# Patient Record
Sex: MALE | Race: WHITE | NOT HISPANIC OR LATINO | URBAN - METROPOLITAN AREA
[De-identification: names, ages, dates, MRNs, and addresses within clinical notes are randomized per-mention and may not be internally consistent; named-entity substitution may affect disease eponyms.]

---

## 2024-04-10 ENCOUNTER — APPOINTMENT (OUTPATIENT)
Dept: OCCUPATIONAL THERAPY | Facility: CLINIC | Age: 69
End: 2024-04-10
Payer: MEDICARE

## 2024-04-12 ENCOUNTER — EVALUATION (OUTPATIENT)
Dept: OCCUPATIONAL THERAPY | Facility: CLINIC | Age: 69
End: 2024-04-12
Payer: MEDICARE

## 2024-04-12 DIAGNOSIS — M72.0 PALMAR FASCIAL FIBROMATOSIS (DUPUYTREN): ICD-10-CM

## 2024-04-12 DIAGNOSIS — M72.0 DUPUYTREN'S DISEASE: Primary | ICD-10-CM

## 2024-04-12 PROCEDURE — 97760 ORTHOTIC MGMT&TRAING 1ST ENC: CPT | Mod: GO | Performed by: OCCUPATIONAL THERAPIST

## 2024-04-12 PROCEDURE — 97165 OT EVAL LOW COMPLEX 30 MIN: CPT | Mod: GO | Performed by: OCCUPATIONAL THERAPIST

## 2024-04-12 ASSESSMENT — ENCOUNTER SYMPTOMS
OCCASIONAL FEELINGS OF UNSTEADINESS: 0
LOSS OF SENSATION IN FEET: 0
DEPRESSION: 0

## 2024-04-12 ASSESSMENT — PATIENT HEALTH QUESTIONNAIRE - PHQ9
SUM OF ALL RESPONSES TO PHQ9 QUESTIONS 1 AND 2: 0
1. LITTLE INTEREST OR PLEASURE IN DOING THINGS: NOT AT ALL
2. FEELING DOWN, DEPRESSED OR HOPELESS: NOT AT ALL

## 2024-04-12 ASSESSMENT — PAIN SCALES - GENERAL: PAINLEVEL_OUTOF10: 0 - NO PAIN

## 2024-04-12 ASSESSMENT — PAIN - FUNCTIONAL ASSESSMENT: PAIN_FUNCTIONAL_ASSESSMENT: 0-10

## 2024-04-12 NOTE — PROGRESS NOTES
Occupational Therapy Evaluation    Patient Name:  Tristian Longo   Patient MRN: [id number]  Date: 04/12/24  Time Calculation  Start Time: 0950  Stop Time: 1030  Time Calculation (min): 40 min    OT Evaluation Time Entry  OT Evaluation (Low) Time Entry: 10  OT Therapeutic Procedures Time Entry  Orthotic Management Training Time Entry: 30                   ASSESSMENT:  Patient was referred to occupational therapy for an evaluation and treatment s/p left IV and V digit dupuytren's release. OT evaluation completed this date.  Patient's main functional deficits include decreased use of left hand for lifting, carrying, ADLs .  Patient would benefit from skilled OT in order to increase range of motion and education on splint instructions.    OT fabricated thermoplastic night extension splint for his left hand to be worn at night for next 4 months.  Patient was instructed in rationale behind splint purpose, wearing schedule, precautions and care of splint.  Patient verbalized good understanding and demonstrated good ability to don/doff splint correctly.    Patient was instructed to call OT with fit issues and/or questions.  Patient to follow up with physician in 10 days for suture removal.    PLAN:   OT intervention plan includes: education/instruction, home program, orthotic fitting and training.    Plan of care was developed with input and agreement by the patient.       Patient to be discharged from skilled OT at this time with wear and care instructions.     Insurance:  Visit number: 1 Insurance Type: Payor: MEDICARE / Plan: MEDICARE PART A AND B / Product Type: *No Product type* /   Authorization or Plan of Care date Range: 4/12/24-5/12/24  Copay: NA  Referred by: Tristian Fox MD     SUBJECTIVE:  Patient is a 68 year old who attends OT evaluation s/p dupuytren's release to IV and V digits of left hand on 4/10/24..  he  reports no complaints of pain at this time.  He is sent to OT for night splint fabrication  "per physician script.    he is RHD   his chief complaint is inability ot use left hand.  his goal for Occupational Therapy is to return to full hand function      he lives with his spouse in a single family home. he is retired.    General:  Reason for visit: Dupuytren's contracture/splint  Referred by: Dr. Tristian Fox    [facility] includes: unremarkable    Medical Screening:   Reviewed medical history form with patient and medical screening assessed.     Precautions: sutures intact   Fall Risk: None          Pain Assessment:      Pain Assessment: 0-10      Pain (0-10): 0       Pain Location left hand    OBJECTIVE:  AROM:  Full AROM I-III digits  75% AROM IV and V digits    Strength:  NT as sutures intact    Outcome Measures:  OT Adult Other Outcome Measures  Other Outcome Measures: Quick Dash 28  (38.64%)      Goals:  1.  Patient to don and doff splint without difficulty and verbalize understanding of wearing schedule and precautions.    Treatment Performed: (\"NP\" = Not Performed)     Treatment:  Low Complex OT Eval 10 min    Therapeutic Exercise: NP  -   Therapeutic Activities: NP  -   Manual Therapy: NP  -   Neuromuscular Re-Education: NP  -   Modalities: NP  -   Orthotic Fit and Train: 30 min  - Fabricated night extension splint for IV and V digit    Education: Educated regarding proper donning/doffing and wearing schedule of orthotic. And precautions associated.  "

## 2024-05-03 ENCOUNTER — AMBULATORY SURGICAL CENTER (OUTPATIENT)
Dept: URBAN - METROPOLITAN AREA SURGERY 12 | Facility: SURGERY | Age: 69
End: 2024-05-03
Payer: MEDICARE

## 2024-05-03 VITALS
TEMPERATURE: 97.5 F | DIASTOLIC BLOOD PRESSURE: 73 MMHG | DIASTOLIC BLOOD PRESSURE: 80 MMHG | RESPIRATION RATE: 18 BRPM | DIASTOLIC BLOOD PRESSURE: 84 MMHG | SYSTOLIC BLOOD PRESSURE: 140 MMHG | OXYGEN SATURATION: 95 % | OXYGEN SATURATION: 100 % | HEART RATE: 49 BPM | HEART RATE: 62 BPM | RESPIRATION RATE: 12 BRPM | RESPIRATION RATE: 17 BRPM | OXYGEN SATURATION: 100 % | RESPIRATION RATE: 16 BRPM | OXYGEN SATURATION: 94 % | DIASTOLIC BLOOD PRESSURE: 80 MMHG | OXYGEN SATURATION: 96 % | DIASTOLIC BLOOD PRESSURE: 71 MMHG | OXYGEN SATURATION: 98 % | RESPIRATION RATE: 15 BRPM | HEART RATE: 64 BPM | SYSTOLIC BLOOD PRESSURE: 123 MMHG | SYSTOLIC BLOOD PRESSURE: 132 MMHG | DIASTOLIC BLOOD PRESSURE: 69 MMHG | RESPIRATION RATE: 12 BRPM | DIASTOLIC BLOOD PRESSURE: 79 MMHG | SYSTOLIC BLOOD PRESSURE: 133 MMHG | SYSTOLIC BLOOD PRESSURE: 115 MMHG | OXYGEN SATURATION: 99 % | SYSTOLIC BLOOD PRESSURE: 119 MMHG | HEART RATE: 52 BPM | RESPIRATION RATE: 20 BRPM | RESPIRATION RATE: 16 BRPM | HEART RATE: 47 BPM | RESPIRATION RATE: 13 BRPM | RESPIRATION RATE: 18 BRPM | DIASTOLIC BLOOD PRESSURE: 73 MMHG | HEART RATE: 51 BPM | DIASTOLIC BLOOD PRESSURE: 79 MMHG | HEART RATE: 61 BPM | HEART RATE: 47 BPM | RESPIRATION RATE: 14 BRPM | DIASTOLIC BLOOD PRESSURE: 74 MMHG | SYSTOLIC BLOOD PRESSURE: 154 MMHG | RESPIRATION RATE: 18 BRPM | DIASTOLIC BLOOD PRESSURE: 73 MMHG | RESPIRATION RATE: 19 BRPM | OXYGEN SATURATION: 95 % | SYSTOLIC BLOOD PRESSURE: 115 MMHG | OXYGEN SATURATION: 99 % | DIASTOLIC BLOOD PRESSURE: 74 MMHG | WEIGHT: 190 LBS | HEART RATE: 72 BPM | OXYGEN SATURATION: 97 % | HEART RATE: 72 BPM | DIASTOLIC BLOOD PRESSURE: 74 MMHG | OXYGEN SATURATION: 96 % | RESPIRATION RATE: 11 BRPM | OXYGEN SATURATION: 97 % | TEMPERATURE: 97.5 F | HEIGHT: 69 IN | HEART RATE: 64 BPM | DIASTOLIC BLOOD PRESSURE: 77 MMHG | OXYGEN SATURATION: 95 % | TEMPERATURE: 97.5 F | HEART RATE: 51 BPM | SYSTOLIC BLOOD PRESSURE: 115 MMHG | SYSTOLIC BLOOD PRESSURE: 137 MMHG | RESPIRATION RATE: 13 BRPM | HEART RATE: 62 BPM | RESPIRATION RATE: 11 BRPM | DIASTOLIC BLOOD PRESSURE: 84 MMHG | SYSTOLIC BLOOD PRESSURE: 137 MMHG | OXYGEN SATURATION: 94 % | RESPIRATION RATE: 15 BRPM | DIASTOLIC BLOOD PRESSURE: 69 MMHG | RESPIRATION RATE: 20 BRPM | SYSTOLIC BLOOD PRESSURE: 114 MMHG | SYSTOLIC BLOOD PRESSURE: 120 MMHG | RESPIRATION RATE: 11 BRPM | DIASTOLIC BLOOD PRESSURE: 81 MMHG | RESPIRATION RATE: 14 BRPM | SYSTOLIC BLOOD PRESSURE: 140 MMHG | HEART RATE: 47 BPM | SYSTOLIC BLOOD PRESSURE: 123 MMHG | SYSTOLIC BLOOD PRESSURE: 120 MMHG | DIASTOLIC BLOOD PRESSURE: 81 MMHG | SYSTOLIC BLOOD PRESSURE: 137 MMHG | WEIGHT: 190 LBS | SYSTOLIC BLOOD PRESSURE: 119 MMHG | HEART RATE: 52 BPM | DIASTOLIC BLOOD PRESSURE: 71 MMHG | RESPIRATION RATE: 19 BRPM | OXYGEN SATURATION: 94 % | HEIGHT: 69 IN | HEART RATE: 72 BPM | OXYGEN SATURATION: 100 % | DIASTOLIC BLOOD PRESSURE: 71 MMHG | HEIGHT: 69 IN | SYSTOLIC BLOOD PRESSURE: 120 MMHG | DIASTOLIC BLOOD PRESSURE: 84 MMHG | OXYGEN SATURATION: 97 % | DIASTOLIC BLOOD PRESSURE: 81 MMHG | OXYGEN SATURATION: 98 % | HEART RATE: 59 BPM | HEART RATE: 61 BPM | SYSTOLIC BLOOD PRESSURE: 123 MMHG | DIASTOLIC BLOOD PRESSURE: 69 MMHG | RESPIRATION RATE: 15 BRPM | HEART RATE: 62 BPM | SYSTOLIC BLOOD PRESSURE: 154 MMHG | HEART RATE: 59 BPM | OXYGEN SATURATION: 99 % | RESPIRATION RATE: 16 BRPM | RESPIRATION RATE: 19 BRPM | SYSTOLIC BLOOD PRESSURE: 133 MMHG | SYSTOLIC BLOOD PRESSURE: 131 MMHG | OXYGEN SATURATION: 98 % | OXYGEN SATURATION: 96 % | RESPIRATION RATE: 17 BRPM | SYSTOLIC BLOOD PRESSURE: 114 MMHG | SYSTOLIC BLOOD PRESSURE: 154 MMHG | RESPIRATION RATE: 17 BRPM | RESPIRATION RATE: 13 BRPM | SYSTOLIC BLOOD PRESSURE: 119 MMHG | DIASTOLIC BLOOD PRESSURE: 79 MMHG | DIASTOLIC BLOOD PRESSURE: 80 MMHG | DIASTOLIC BLOOD PRESSURE: 77 MMHG | SYSTOLIC BLOOD PRESSURE: 132 MMHG | RESPIRATION RATE: 14 BRPM | HEART RATE: 64 BPM | SYSTOLIC BLOOD PRESSURE: 133 MMHG | HEART RATE: 52 BPM | SYSTOLIC BLOOD PRESSURE: 131 MMHG | SYSTOLIC BLOOD PRESSURE: 131 MMHG | HEART RATE: 49 BPM | RESPIRATION RATE: 20 BRPM | HEART RATE: 51 BPM | HEART RATE: 49 BPM | SYSTOLIC BLOOD PRESSURE: 140 MMHG | DIASTOLIC BLOOD PRESSURE: 77 MMHG | SYSTOLIC BLOOD PRESSURE: 114 MMHG | HEART RATE: 59 BPM | SYSTOLIC BLOOD PRESSURE: 132 MMHG | HEART RATE: 61 BPM | WEIGHT: 190 LBS | RESPIRATION RATE: 12 BRPM

## 2024-05-03 DIAGNOSIS — Z12.11 ENCOUNTER FOR SCREENING FOR MALIGNANT NEOPLASM OF COLON: ICD-10-CM

## 2024-05-03 DIAGNOSIS — K64.8 OTHER HEMORRHOIDS: ICD-10-CM

## 2024-05-03 DIAGNOSIS — K64.4 RESIDUAL HEMORRHOIDAL SKIN TAGS: ICD-10-CM

## 2024-05-03 DIAGNOSIS — K57.30 DIVERTICULOSIS OF LARGE INTESTINE WITHOUT PERFORATION OR ABS: ICD-10-CM

## 2024-05-03 PROCEDURE — G0121 COLON CA SCRN NOT HI RSK IND: HCPCS | Performed by: INTERNAL MEDICINE

## 2024-05-03 RX ADMIN — SIMETHICONE 60 MG: 20 EMULSION ORAL at 10:15

## 2024-05-08 ENCOUNTER — TREATMENT (OUTPATIENT)
Dept: OCCUPATIONAL THERAPY | Facility: CLINIC | Age: 69
End: 2024-05-08
Payer: MEDICARE

## 2024-05-08 DIAGNOSIS — M72.0 DUPUYTREN'S DISEASE: Primary | ICD-10-CM

## 2024-05-08 PROCEDURE — 97110 THERAPEUTIC EXERCISES: CPT | Mod: GO | Performed by: OCCUPATIONAL THERAPIST

## 2024-05-08 PROCEDURE — 97763 ORTHC/PROSTC MGMT SBSQ ENC: CPT | Mod: GO | Performed by: OCCUPATIONAL THERAPIST

## 2024-05-08 NOTE — PROGRESS NOTES
"                                                             OCCUPATIONAL THERAPY TREATMENT NOTE    Patient Name:  Tristian Longo   Patient MRN: [id number]  Date: 05/08/24  Time Calculation  Start Time: 0300  Stop Time: 0325  Time Calculation (min): 25 min    Insurance:  Visit number: 2   Insurance Type: Payor: MEDICARE / Plan: MEDICARE PART A AND B / Product Type: *No Product type* /   Authorization or Plan of Care date Range: 4/12/24-5/12/24   Referred by: Tristian Fox MD        OT Therapeutic Procedures Time Entry  Orthotic/Prosthetic Mgmt and/or Training (Subs Encounter) Time Entry: 15  Therapeutic Exercise Time Entry: 10                     General:  Reason for visit: Dupuytren's contracture/splint  Referred by: Dr. Tristian Fox    Assessment:  Progress towards functional goals: Improved mobility in left hand  Response to interventions:  Good fit with splint after thermoplastic adjustment to increase extension of digits.  Patient also demonstrated good technique and verbalized good understanding of progression of HEP. Patient is independent with ADL and home management tasks and will be discharged from skilled OT at this time.  Justification for continued skilled care: Patient to be discharged from skilled OT    Plan:  Discharge from occupational therapy    Therapy diagnoses:   1. Dupuytren's disease             Subjective:  Patient reports after sutures removed he feels the splint could be positioned in more extension.     Progress towards functional goal:  Improved mobility in left hand  Pain Location left hand  Pain (0-10): 0   HEP adherence / understanding: compliance with the instructed home exercises.    Precautions:  Fall Risk: None    Precautions listed: none    Objective:  NA    Treatment Performed: (\"NP\" = Not Performed)     Therapeutic Exercise: 10 min  - prayer stretches for MP ext IV and V digits  -wrist ext table stretches   - blue putty pressouts  -silicone scar patch for wear under splint for " scar remodeling  Therapeutic Activities:   -   Manual Therapy:  -   Neuromuscular Re-Education:   -   Modalities:   -   Orthotic Fit and Treat: 15 min  Adjusted thermoplast to increase IV and V digit MP and IP extension, issued new strapping for splint    Education: Home exercise program instructed and issued.